# Patient Record
Sex: MALE | Race: WHITE | NOT HISPANIC OR LATINO | Employment: UNEMPLOYED | URBAN - METROPOLITAN AREA
[De-identification: names, ages, dates, MRNs, and addresses within clinical notes are randomized per-mention and may not be internally consistent; named-entity substitution may affect disease eponyms.]

---

## 2017-01-31 ENCOUNTER — HOSPITAL ENCOUNTER (EMERGENCY)
Facility: MEDICAL CENTER | Age: 32
End: 2017-02-03
Attending: EMERGENCY MEDICINE
Payer: MEDICAID

## 2017-01-31 DIAGNOSIS — F28 OTHER PSYCHOTIC DISORDER NOT DUE TO SUBSTANCE OR KNOWN PHYSIOLOGICAL CONDITION (HCC): ICD-10-CM

## 2017-01-31 LAB
AMPHET UR QL SCN: NEGATIVE
BARBITURATES UR QL SCN: NEGATIVE
BENZODIAZ UR QL SCN: POSITIVE
BZE UR QL SCN: NEGATIVE
CANNABINOIDS UR QL SCN: POSITIVE
MDMA UR QL SCN: NEGATIVE
METHADONE UR QL SCN: NEGATIVE
OPIATES UR QL SCN: NEGATIVE
OXYCODONE UR QL SCN: NEGATIVE
PCP UR QL SCN: NEGATIVE
POC BREATHALIZER: 0 PERCENT (ref 0–0.01)
PROPOXYPH UR QL SCN: NEGATIVE

## 2017-01-31 PROCEDURE — 302970 POC BREATHALIZER: Performed by: EMERGENCY MEDICINE

## 2017-01-31 PROCEDURE — 90791 PSYCH DIAGNOSTIC EVALUATION: CPT

## 2017-01-31 PROCEDURE — 80307 DRUG TEST PRSMV CHEM ANLYZR: CPT

## 2017-01-31 PROCEDURE — 99285 EMERGENCY DEPT VISIT HI MDM: CPT

## 2017-01-31 NOTE — ED NOTES
Pt continues to rest with the covers over his head, chest rise and fall visible. Sitter in place.

## 2017-01-31 NOTE — ED NOTES
"Pt got out of bed and was attempting to walk out. Pt informed that he is not able to leave at this time because of the legal hold. Pt states \"I didn't do nothing wrong to be here and two people have to legally sign me in for me to be held\". Security was called, but pt did go back to his room voluntarily. Ray with [facility] at bedside at this time.   "

## 2017-01-31 NOTE — ED NOTES
Pt sitting up on edge of bed repeatedly running his fingers through his hair and crossing his arms. Pt provided water. Asked pt if he had any additional needs, he did not respond. Did state that he would like the light on.

## 2017-01-31 NOTE — CONSULTS
Unable to assess at this moment.  Patient sound asleep, after 3 days of being awake, and was given xanax by brother.  Brother reports patient visiting and has psych history.  No charge at this time, re-counsult when awake.

## 2017-01-31 NOTE — ED NOTES
Pt ambulatory to 35, changing into hospital clothing. All medically unnecessary equipment out of room

## 2017-01-31 NOTE — ED PROVIDER NOTES
ED Provider Note    Scribed for Andrzej Maldonado M.D. by Lazaro Parikh. 1/31/2017, 11:55 AM.    Means of arrival: Walk in   History obtained from: Patient's brother  History limited by: None    CHIEF COMPLAINT  Chief Complaint   Patient presents with   • Insomnia   • Psych Eval     not currently taking meds       HPI  Mukesh Darnell is a 31 y.o. male who presents to the Emergency Department for insomnia and psych evaluation. Per patient's brother, the patient has not slept in the past 3-4 days. He says the patient is visiting him from out of town. He says he believes the patient a psychiatric disorder but he is unsure of his diagnosis, and the patient used to take medications in the past. He states he gave the patient a Xanax to help him sleep. He says the patient did mention about hurting himself. He also reports of manic behavior. He denies any methamphetamine or any other illicit drug use or alcohol use.     History obtained from patient's brother.     REVIEW OF SYSTEMS  Pertinent positives include Insomnia, manic behavior, SI. As above, all other systems reviewed and are negative.   See HPI for further details.     PAST MEDICAL HISTORY   has a past medical history of Psychiatric disorder.    SURGICAL HISTORY  patient denies any surgical history    SOCIAL HISTORY  Social History   Substance Use Topics   • Smoking status: Unknown If Ever Smoked   • Smokeless tobacco: None   • Alcohol Use: No      History   Drug Use No       FAMILY HISTORY  History reviewed. No pertinent family history.    CURRENT MEDICATIONS  No current facility-administered medications for this encounter.  No current outpatient prescriptions on file.    ALLERGIES  No Known Allergies    PHYSICAL EXAM  VITAL SIGNS: /80 mmHg  Pulse 108  Temp(Src) 37.4 °C (99.4 °F) (Temporal)  Resp 14  Wt 84.6 kg (186 lb 8.2 oz)  SpO2 95%  Vitals reviewed.  Constitutional: Will not answer questions. No apparent distress.  HENT: No signs of trauma,  Bilateral external ears normal, Nose normal.   Eyes: Pupils are equal and reactive, Conjunctiva normal, Non-icteric.   Neck: Normal range of motion, No tenderness, Supple, No stridor.   Lymphatic: No lymphadenopathy noted.   Cardiovascular: Regular rate and rhythm, no murmurs.   Thorax & Lungs: Normal breath sounds, No respiratory distress, No wheezing, No chest tenderness.   Abdomen: Bowel sounds normal, Soft, No tenderness, No peritoneal signs, No masses, No pulsatile masses.   Skin: Warm, Dry, No erythema, No rash.   Back: No bony tenderness, No CVA tenderness.   Extremities: Intact distal pulses, No edema, No tenderness, No cyanosis  Musculoskeletal: Good range of motion in all major joints. No tenderness to palpation or major deformities noted.   Neurologic: Alert , Normal motor function, Normal sensory function, No focal deficits noted.   Psychiatric: The patient will not respond question. Brother reports patient may have some suicidal ideations and manic behavior.     DIAGNOSTIC STUDIES / PROCEDURES    LABS  Labs Reviewed   POC BREATHALIZER - Normal   URINE DRUG SCREEN      Negative breathalyzer for alcohol      All labs reviewed by me.    COURSE & MEDICAL DECISION MAKING  Nursing notes, VS, PMSFHx reviewed in chart.  Differential diagnoses include but not limited to: substance abuse, alcohol intoxication, pyschosis.    11:55 AM - Patient seen and examined at bedside. Ordered for POC breathalizer, Urine drug screen to evaluate.    12:07 PM - I discussed the patient's case and the above findings with [facility] who will evaluate the patient.     1:52 PM - I spoke with [facility] who was unable to evaluate the patient due to the patient receiving Xanax from his brother. He will wait until the patient to wake up. I filled out legal hold 2000.     3:47 PM - I discussed the patient's case and the above findings with [facility]. He states patient has long pauses. He has no emotional response. He is almost  catatonic. The patient does not have insurance. We are in agreement patient will held here in the hospital and transferred for inpatient rehabilitation.      DISPOSITION:  Patient will be transferred to [facility] when accepted.       FINAL IMPRESSION  1. Other psychotic disorder not due to substance or known physiological condition          Lazaro CASAS (Scribe), am scribing for, and in the presence of, Andrzej Maldonado M.D..    Electronically signed by: Lazaro Parikh (Debraibkellie), 1/31/2017    Andrzej CASAS M.D. personally performed the services described in this documentation, as scribed by Lazaro Parikh in my presence, and it is both accurate and complete.    The note accurately reflects work and decisions made by me.  Andrzej Maldonado  1/31/2017  4:40 PM

## 2017-01-31 NOTE — ED NOTES
[facility] contacted. Pt's brother states that the pt is here visiting and he is concerned that he is having a mental break. Sates that the pt has some sort of psychiatric disorder but is unsure of his diagnosis. He does not believe that pt is under the influence of drugs or alcohol. Does report that pt has not been sleeping. States that pt had a mental break before.

## 2017-01-31 NOTE — ED NOTES
Pt resting calmly, blanket over his head. Visible chest rise and fall noted. Awaiting life skills eval.

## 2017-01-31 NOTE — ED NOTES
Chief Complaint   Patient presents with   • Insomnia   • Psych Eval     not currently taking meds       Family describes manic behavior.  Family states pt stated to them about suicidal ideation

## 2017-02-01 PROCEDURE — 700102 HCHG RX REV CODE 250 W/ 637 OVERRIDE(OP): Performed by: PSYCHIATRY & NEUROLOGY

## 2017-02-01 PROCEDURE — A9270 NON-COVERED ITEM OR SERVICE: HCPCS | Performed by: PSYCHIATRY & NEUROLOGY

## 2017-02-01 RX ORDER — CLONAZEPAM 0.5 MG/1
0.5 TABLET ORAL 2 TIMES DAILY
Status: DISCONTINUED | OUTPATIENT
Start: 2017-02-01 | End: 2017-02-04 | Stop reason: HOSPADM

## 2017-02-01 RX ORDER — LURASIDONE HYDROCHLORIDE 40 MG/1
40 TABLET, FILM COATED ORAL
Status: DISCONTINUED | OUTPATIENT
Start: 2017-02-01 | End: 2017-02-02

## 2017-02-01 RX ADMIN — LURASIDONE HYDROCHLORIDE 40 MG: 40 TABLET, FILM COATED ORAL at 18:23

## 2017-02-01 RX ADMIN — CLONAZEPAM 0.5 MG: 0.5 TABLET ORAL at 10:37

## 2017-02-01 RX ADMIN — CLONAZEPAM 0.5 MG: 0.5 TABLET ORAL at 21:55

## 2017-02-01 ASSESSMENT — PAIN SCALES - GENERAL: PAINLEVEL_OUTOF10: 0

## 2017-02-01 NOTE — DISCHARGE PLANNING
Faxed UDS, BAL and ERP note to [facility] at their request. Fax confirmation received.

## 2017-02-01 NOTE — ED NOTES
Pt has his gown on backwards and is sitting on the bed and has his penis exposed and he is touching himself and showing the female sitter. Placed a 2nd gown on pt with opening in back and advised pt that is he exposes himself again that he will be put into restraints and his behavior is not acceptable. Changed sitter to male.  Pt refuses to talk to this RN.

## 2017-02-01 NOTE — CONSULTS
"[facility]   TRIAGE ASSESSMENT    Name: Mukesh Darnell  MRN: [id number]  : 1985  Age: 31 y.o.  Date of assessment: 2017  PCP: No primary care provider on file.  Persons in attendance: Patient and brother    CHIEF COMPLAINT/PRESENTING ISSUE (as stated by patient what is problem \"nothing\", brought in by brother katiana who states patient hasn't sleep for three days, attempted to run away in snow with only underwear on and mentioned dying.  Patient has long pauses, unable to answer most questions and zero eye contact. Brother reports definate cycles of staying awake for days.  Beliefs patient has stopped all drugs including cigerates, pot and alcohol. ):   Chief Complaint   Patient presents with   • Insomnia   • Psych Eval     not currently taking meds        CURRENT LIVING SITUATION/SOCIAL SUPPORT: has been staying in [place] [facility] but came to [place] yesterday with brother due to families concerns.  Report patient has been hospitalized for mental breakdown but unaware of dx.     BEHAVIORAL HEALTH TREATMENT HISTORY  Does patient/parent report a history of prior behavioral health treatment for patient?   yes but unable to tell me where or when    SAFETY ASSESSMENT - SELF  Does patient acknowledge current or past symptoms of dangerousness to self? no  Does parent/significant other report patient has current or past symptoms of dangerousness to self? yes  Does presenting problem suggest symptoms of dangerousness to self? Yes:     Past Current    Suicidal Thoughts: [x]  []    Suicidal Plans: []  []    Suicidal Intent: []  []    Suicide Attempts: []  []    Self-Injury []  []      For any boxes checked above, provide detail: mentioined to brother today but no response but to say no during interview.    History of suicide by family member: no  History of suicide by friend/significant other: no  Recent change in frequency/specificity/intensity of suicidal thoughts or self-harm behavior? no  Current access to " firearms, medications, or other identified means of suicide/self-harm? no  If yes, willing to restrict access to means of suicide/self-harm? no  Protective factors present:  unknown    SAFETY ASSESSMENT - OTHERS  Does patient acknowledge current or past symptoms of aggressive behavior or risk to others? no  Does parent/significant other report patient has current or past symptoms of aggressive behavior or risk to others?  no  Does presenting problem suggest symptoms of dangerousness to others? No    Crisis Safety Plan completed and copy given to patient? N\A and patient unable to participate.    ABUSE/NEGLECT SCREENING  Does patient report feeling “unsafe” in his/her home, or afraid of anyone?  no  Does patient report any history of physical, sexual, or emotional abuse?  no  Does parent or significant other report any of the above? no  Is there evidence of neglect by self?  no  Is there evidence of neglect by a caregiver? no  Does the patient/parent report any history of CPS/APS/police involvement related to suspected abuse/neglect or domestic violence? no  Based on the information provided during the current assessment, is a mandated report of suspected abuse/neglect being made?  No    SUBSTANCE USE SCREENING  Has in past per brother but none recently per brother      UDS results: pending  Breathalyzer results: 0.002    Risk factors for detox (check all that apply):  No responses from pt.  [] Seizures   [] Diaphoretic (sweating)   [] Tremors   [] Hallucinations   [] Increased blood pressure   [] Decreased blood pressure   [] Other    [] Patient education on risk factors for detoxification and instructed to return to ER as needed.  MENTAL STATUS   Participation: Limited verbal participation  Grooming: Casual  Orientation: Alert and confused  Behavior: Tense  Eye contact: Indirect  Mood: Depressed and flat  Affect: Constricted  Thought process: thought blocking  Thought content: Evidence of delusion seems to be  answering voices at times  Speech: Latency of response  Perception: Evidence of auditory hallucination  Memory:  unable to assess  Insight: Poor  Judgment:  Poor  Other:    Collateral information: brother  Source:  [] Significant other present in person:   [] Significant other by telephone  [] [facility]   [] [facility] Nursing Staff  [x] [facility]  [] Other:     [] Unable to complete full assessment due to:  [] Acute intoxication  [] Patient declined to participate/engage  [x] Patient verbally unresponsive  [] Significant cognitive deficits  [x] Significant perceptual distortions or behavioral disorganization  [] Other:      CLINICAL IMPRESSIONS:  Primary:  Schizophrenic, paranoid type  Secondary:  none       IDENTIFIED NEEDS/PLAN:  [Trigger DISPOSITION list for any items marked]    [x]  Imminent safety risk - self [] Imminent safety risk - others   []  Acute substance withdrawl [x]  Psychosis/Impaired reality testing   []  Mood/anxiety []  Substance use/Addictive behavior   []  Maladaptive behaviro []  Parent/child conflict   []  Family/Couples conflict []  Biomedical   []  Housing []  Financial   []   Legal  Occupational/Educational   []  Domestic violence []  Other:     Disposition: Actively being addressed by Legal Hold    Does patient express agreement with the above plan? no    Referral appointment(s) scheduled? no    Alert team only:   I have discussed findings and recommendations with Dr. Acuña who is in agreement with these recommendations.     Referral documentation sent to the following facilities:  [facility]- no insurance.     Luis Antonio Holley R.N.  1/31/2017

## 2017-02-01 NOTE — ED PROVIDER NOTES
This is an addendum to the note on this patient.  For further details and full chart information, see the previously signed note.      1:01 PM Recheck: Patient re-evaluated at beside. Patient reports feeling better and has no complaints at this time.         IRaquel (Scribe), am scribing for, and in the presence of, Bryon Allen M.D..    Electronically signed by: Raquel Wall (Scribe), 2/1/2017    IBryon M.D. personally performed the services described in this documentation, as scribed by Raquel Wall in my presence, and it is both accurate and complete.    The note accurately reflects work and decisions made by me.  Bryon Allen  2/1/2017  1:09 PM

## 2017-02-01 NOTE — ED PROVIDER NOTES
ED PROVIDER NOTE    Scribed for Aleksandr Helms M.D. by Sailaja Jessica. 2/1/2017, 2:10 AM.    This is an addendum to the note on Mukesh Darnell. For further details and full chart entry, see the previously signed ED Provider Note written by Dr. Moore (ERP).     2:10 AM - Patient's case was transferred into my care by Dr. Palacios. See previously signed note for further details. Patient still awaiting transfer to psychiatric facility.     FINAL IMPRESSION   1. Other psychotic disorder not due to substance or known physiological condition         ISailaja (Scribe), am scribing for, and in the presence of, Aleksadnr Helms M.D..    Electronically signed by: Sailaja Jessica (Scribe), 2/1/2017    Aleksandr CASAS M.D., personally performed the services described in this documentation, as scribed by Sailaja Jessica in my presence, and it is both accurate and complete.    The note accurately reflects work and decisions made by me.  Aleksandr Helms  2/1/2017  6:48 AM

## 2017-02-01 NOTE — ED NOTES
Pt resting comfortably with blankets over his head. Visible chest rise and fall noted. Pt has drank multiple cups of water, has not eaten as of yet. Sitter remains in place.

## 2017-02-01 NOTE — ED NOTES
Spoke with patient at bedside and with brother Leo on the phone at [phone].     Brother has no knowledge of patient having any current prescription medications.  No antibiotics per brother in the past 30 days.  Brother gave patient Xanax 0.5 mg x 1 this morning to make him sleep.    Patient reports no current prescription medications or OTC products.      Charmaine Yanes, JeniseD, BCPS

## 2017-02-01 NOTE — DISCHARGE PLANNING
Medical Social Work    Referral: Legal Hold    Intervention: Legal Hold Paperwork given to SW by [facility] RN: Tiago    Legal Hold Initiated: Date: 01.31.2017 Time: 1146    Patient’s Insurance Listed on Face Sheet: None    Referrals sent to: [facility]    Plan: Patient will transfer to mental health facility once acceptance is obtained.       Awaiting ERP note and UDS. Pt's brother brought him in. Pt unable to participate in [facility] interview. Email to \[facility]\"" to screen pt (through family) for medicaid coverage.

## 2017-02-01 NOTE — PROGRESS NOTES
"Patient's home medications have been reviewed by the pharmacy team.     Past Medical History   Diagnosis Date   • Psychiatric disorder      unknown, but \"some diagnosis\" per brother       Patient's Medications    No medications on file          A:  Medications do not appear to be contributing to current complaints.  Family reports patient not taking any medications at this time.         P:    No recommendations at this time.       Niki Feliciano, PharmD, BCPS        "

## 2017-02-01 NOTE — PSYCHIATRY
"PSYCHIATRIC CONSULTATION:  Reason for consult: alf  Requesting Physician: Scooter   Psychiatric Supervising Attending: Mercedes        Chief Complaint: 32 y/o man with a history of alf presenting with 3-4 days of no sleep and bizarre behavior       HPI:  Mr. Darnell is a 32 y/o man with previous hospitalizations for alf and psychosis. He hadn't slept for 3-4 days and tried to leave the house in the snow while only in his underwear. He reports that he often can't remember what happens afterward. He reports he once woke up in care home related to this. He describes these as \"freak outs\" and states he can't sleep, feels very anxious, and hears voices that are \"very distracting\" during these times. He hasn't taken medications between episodes due to side effects.     On this admission to ED he was behaving very strangely, at first almost catatonic and refusing to eat, move, or talk, then masturbating repeatedly in front of staff. He doesn't remember this and is upset per his report that this happened. He states \"that's disgusting, I'm so sorry I did that.\"     He has been on multiple medications in the past but can't remember all of them. He states that most of them made him too drowsy to tolerate, and recognizes lithium and zyprexa as meds he's tried.     He denies si. He does report he's been depressed for some time, with anhedonia, low energy, and decreased appetite. States his family has tried to encourage him to leave the house. He thinks being depressed for some time may be what triggered this.   Review of Current Systems:  Depression +  Alf continues to be hypomanic  Anxiety +  Psychosis resolving   PTSD not evaluated  OCD  Not evaluated  Medical: denies     MSE:  Vitals:  Filed Vitals:    01/31/17 1638 01/31/17 1853 02/01/17 0425 02/01/17 0853   BP: 123/73 131/73 110/76 121/76   Pulse: 98 110 107 103   Temp: 36.3 °C (97.4 °F)  36.6 °C (97.8 °F)    TempSrc:       Resp: 15 16 16    Height:       Weight:     "   SpO2: 96%  100%        Musculoskeletal: no psychomotor agitation or retardaion   Appearance: inappropriate affect- intense eye contact and smilling strangely at times   Thought Process: mildly circumstantial at times  Though Content:denies a/vh. No overt evidence delusion   Speech: slowed   Mood: depressed  Affect: inappropriate   SI/HI:denies   Attention: fair   Memory:impaired  Orientation:oriented  Insight and Judgment:   fair  Neurological Testing: n/a    Past Psych Hx:  Previous psychiatric hospitalizations for alf and psychosis.   No outpatient provider at this time.     Family Psych Hx:  alcholism  Social Hx:  Lives with sisters. Family very involved.   Not working currently   Drugs/Alcohol/Tobacco Hx:   Marijuana  Occasional alcohol, denies heavy use   Medical Hx:         Denies     Allergies:  No Known Allergies    Medications:   No current facility-administered medications on file prior to encounter.     No current outpatient prescriptions on file prior to encounter.       Labs/ Imaging:  No orders to display     UDS + for benzos and marijuana     SI/HI Assessment Risk:    low to moderate at this time     Assessment:  Bipolar I disorder, current episode mixed        Plan:  He responded well to the xanax as an anti-manic. Also had some catatonic features so will schedule some klonopin   Adding latuda 40mg po qevening for bipolar disorder   Extending hold        Psych will follow.   Thank you for the consult.

## 2017-02-01 NOTE — ED NOTES
Pt ambulatory to bathroom, did provide urine sample. Sent to lab. Pt is currently eating a meal. Pt updated on POC. Pt remains withdrawn but more cooperative than prior.

## 2017-02-01 NOTE — ED NOTES
Pt laying on bed with blanket covering his head and body, pt was masturbating under blanket, advised pt that is it very noticeable and that he needs to stop as he can be viewed by many people in the ER.  Sitter outside room.

## 2017-02-02 ENCOUNTER — APPOINTMENT (OUTPATIENT)
Dept: RADIOLOGY | Facility: MEDICAL CENTER | Age: 32
End: 2017-02-02
Attending: EMERGENCY MEDICINE
Payer: MEDICAID

## 2017-02-02 PROCEDURE — 700111 HCHG RX REV CODE 636 W/ 250 OVERRIDE (IP): Performed by: EMERGENCY MEDICINE

## 2017-02-02 PROCEDURE — 70450 CT HEAD/BRAIN W/O DYE: CPT

## 2017-02-02 PROCEDURE — 96372 THER/PROPH/DIAG INJ SC/IM: CPT

## 2017-02-02 PROCEDURE — 700111 HCHG RX REV CODE 636 W/ 250 OVERRIDE (IP)

## 2017-02-02 PROCEDURE — A9270 NON-COVERED ITEM OR SERVICE: HCPCS | Performed by: PSYCHIATRY & NEUROLOGY

## 2017-02-02 PROCEDURE — 700102 HCHG RX REV CODE 250 W/ 637 OVERRIDE(OP): Performed by: PSYCHIATRY & NEUROLOGY

## 2017-02-02 RX ORDER — DIPHENHYDRAMINE HYDROCHLORIDE 50 MG/ML
25 INJECTION INTRAMUSCULAR; INTRAVENOUS ONCE
Status: COMPLETED | OUTPATIENT
Start: 2017-02-02 | End: 2017-02-02

## 2017-02-02 RX ORDER — RISPERIDONE 1 MG/1
1 TABLET ORAL ONCE
Status: COMPLETED | OUTPATIENT
Start: 2017-02-03 | End: 2017-02-03

## 2017-02-02 RX ORDER — LORAZEPAM 2 MG/ML
INJECTION INTRAMUSCULAR
Status: COMPLETED
Start: 2017-02-02 | End: 2017-02-02

## 2017-02-02 RX ORDER — RISPERIDONE 1 MG/1
1 TABLET ORAL 2 TIMES DAILY
Status: DISCONTINUED | OUTPATIENT
Start: 2017-02-02 | End: 2017-02-03

## 2017-02-02 RX ORDER — LORAZEPAM 2 MG/ML
2 INJECTION INTRAMUSCULAR ONCE
Status: COMPLETED | OUTPATIENT
Start: 2017-02-02 | End: 2017-02-02

## 2017-02-02 RX ORDER — HALOPERIDOL 5 MG/ML
5 INJECTION INTRAMUSCULAR ONCE
Status: COMPLETED | OUTPATIENT
Start: 2017-02-02 | End: 2017-02-02

## 2017-02-02 RX ADMIN — RISPERIDONE 1 MG: 1 TABLET, FILM COATED ORAL at 10:41

## 2017-02-02 RX ADMIN — LORAZEPAM 2 MG: 2 INJECTION INTRAMUSCULAR; INTRAVENOUS at 12:30

## 2017-02-02 RX ADMIN — DIPHENHYDRAMINE HYDROCHLORIDE 25 MG: 50 INJECTION INTRAMUSCULAR; INTRAVENOUS at 15:45

## 2017-02-02 RX ADMIN — CLONAZEPAM 0.5 MG: 0.5 TABLET ORAL at 09:10

## 2017-02-02 RX ADMIN — CLONAZEPAM 0.5 MG: 0.5 TABLET ORAL at 23:35

## 2017-02-02 RX ADMIN — HALOPERIDOL LACTATE 5 MG: 5 INJECTION, SOLUTION INTRAMUSCULAR at 15:24

## 2017-02-02 RX ADMIN — LORAZEPAM 2 MG: 2 INJECTION INTRAMUSCULAR at 12:30

## 2017-02-02 ASSESSMENT — PAIN SCALES - GENERAL: PAINLEVEL_OUTOF10: 0

## 2017-02-02 NOTE — ED NOTES
"Pt is acting a little strange. Pt jumps up and down and keep on yelling \"yahoo\". Pt stops every time he is been told to do so. RN notified. Pt is being monitored by staff.  "

## 2017-02-02 NOTE — ED NOTES
Spoke with Dr Long regarding pt's behavior, pt has gotten OOB and banging his head on the floor 3x since assuming care around 1215pm, pt assisted back to bed each time. No injury noted to pt's head. Per Dr. Long, ok to give 2mg Ativan IM or PO at this time. Will medicate pt per MAR and re-evaluate.

## 2017-02-02 NOTE — ED NOTES
Pt agreed to allow writer to place ice pack to forehead to assist with swelling and pain. Pt was sitting up in bed attempting to remove restraints, remains impulsive, will remain in restraints.

## 2017-02-02 NOTE — ED NOTES
"Pt arrived to [facility] from [facility].  Pt was reportedly standing on his bed and praying \"praise Allah\" out loud, would not follow directions, would not come off the bed.  Pt doing jumping jacks and yelling out.  Pt then got into bed and would not respond or get into a wheelchair.  Pt now resting, does not answer questions, eyes closed, only responds to painful stimuli.  Close obs initiated.  "

## 2017-02-02 NOTE — ED PROVIDER NOTES
ED PROVIDER NOTE    Scribed for Isabela Dimas M.D. by Jasper Bee. 2/2/2017, 8:39 AM.    This is an addendum to the note on Mukesh Darnell. For further details and full chart entry, see the previously signed ED Provider Note written by Dr. Chang (ERP).      8:23 AM - I discussed the patient's case with Dr. Bo (ERP) who will transfer care of the patient to me at this time.        9:20 AM - The patient was examined by me. He is resting comfortably in bed. He reports feeling well and has no complaints or requests at this time.    12:15pm.  PATIENT AGITATED, MEDICATION SCHANGED AND im aTIVAN ORDERED BY dR. Young    DISPOSITION:  Patient will be signed out to the oncoming physician pending transfer to the inpatient psychiatric facility.     FINAL IMPRESSION   1. Other psychotic disorder not due to substance or known physiological condition      IJasper (Zainab), am scribing for, and in the presence of, Isabela Dimas M.D..    Electronically signed by: Jasper Bee (Debraibe), 2/2/2017    IIsabela M.D. personally performed the services described in this documentation, as scribed by Jasper Bee in my presence, and it is both accurate and complete.    The note accurately reflects work and decisions made by me.  Isabela Dimas  2/2/2017  1:14 PM

## 2017-02-02 NOTE — ED PROVIDER NOTES
ED Provider Note Addendum    Scribed for Bhavesh Butler M.D. by Jojo Lindquist on 2/2/2017 at 3:12 PM.     This is an addendum to the note on Mukesh Darnell.  For further details and full chart information, see patient's initial note.       3:12 PM - I discussed the patient's case with Dr. Dimas ([facility]) who will transfer care of the patient to me at this time.        3:12 PM  - Patient evaluated by myself.  Patient is resting comfortably at this time with no complaints. He was informed that a CT of the head will be ordered to evaluate for damage after he was hitting his head on the floor. He understood and verbalized agreement.    3:18 PM - Ordered 5mg Haldol and 25mg Benadryl.     Radiology:  CT-HEAD W/O   Final Result      No acute intracranial abnormality is identified.      The radiologist's interpretation of all radiological studies have been reviewed by me.    Disposition:  Patient will be transferred to an appropriate psychiatric facility in stable condition for further psychiatric care and evaluation.  Patient will be placed under the care of my partner awaiting transfer.    FINAL IMPRESSION  1. 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    Electronically signed by: Jojo Lindquist (Scribe), 2/2/2017    Bhavesh CASAS M.D. personally performed the services described in this documentation, as scribed by Jojo Lindquist in my presence, and it is both accurate and complete.    The note accurately reflects work and decisions made by me.  Bhavesh Butler  2/2/2017  10:26 PM

## 2017-02-02 NOTE — DISCHARGE PLANNING
Medical Social work     SW intern received legal hold extension order from Dr. Long. Legal hold, face sheet and legal hold extension order faxed to Gissel Redmond [phone]. Received fax confirmation.     Note reviewed by: JUDITH Velasco

## 2017-02-02 NOTE — ED NOTES
Pt medicated per MAR, pt educated on medicine administration and safety, if he continues to get OOB he will have to be restrained. Pt verbalized understanding. Security aware of pt status.

## 2017-02-02 NOTE — ED NOTES
"Pt again yelling out \"help me Allah\", got out of bed and started to pray then bang his head on the floor.  ERP aware, page out for IP psych ("[employer] per her request.  Report given to Cindy ALLRED.  "

## 2017-02-02 NOTE — ED NOTES
Notified Dr Dimas of pt in restraints and minimal swelling to anterior forehead, no new orders given.

## 2017-02-02 NOTE — ED NOTES
"Pt began yelling \"help me allah, help me\" Pt squirming around in bed, restraints were adjusted for pt comfort, Dr. Butler at bedside, instructed to give 5mg haldol and 25 mg benadryl IM x 1, POC is CT.   "

## 2017-02-02 NOTE — ED NOTES
"Pt yelling out \"help me\" repeatedly, holding his head, stating the \"voices are too much\".  Comfort provided, pt reoriented.  More calm at this time.  Will continue to monitor.  "

## 2017-02-02 NOTE — ED NOTES
"Received report from LIOR Meza, assumed care, witnessed episode of pt yelling out \"help me allah\", pt was on the floor banging his head repeatedly on the floor, pt was assisted back to bed by writer and Lior Peck, attempting to reach Dr. Long, called 2x voice mail box full. Attempted inpt psych number, no answer there either, will keep trying. Pt remains in bed at this time.   "

## 2017-02-02 NOTE — PSYCHIATRY
PSYCHIATRIC FOLLOW-UP NOTE  Supervising Attending: Mercedes    ID:   32 y/o man with bipolar disorder    S:  Doing worse today, was screaming this morning in SP and was praying ritualistically on the ground. Hearing things. He feels very anxious. States he feels crazy. Discussed alternate medication options, but unfortunately he can't remember much of what he's tried before.     ROS:  Depressed  Grandiose, racing thoughts   Delusional  psychotic  All other systems reviewed and are negative.     MSE:  Vitals:  Filed Vitals:    02/01/17 2000 02/02/17 0000 02/02/17 0400 02/02/17 0920   BP: 131/78 115/62 126/75 140/86   Pulse: 95 62 68 88   Temp: 36.9 °C (98.5 °F) 36.8 °C (98.2 °F) 36.3 °C (97.3 °F) 36.8 °C (98.3 °F)   TempSrc:       Resp: 16 15 16 17   Height:       Weight:       SpO2: 94% 96% 94% 93%       Appearance: grooming poor   Behavior:+psychomotor agitation   Speech: stilted  Mood: mixed  Affect:labile  Thought Process:tangential  Thought Content +avh  Cognition: poor   Memory:poor   Attention: poor   Judgment:impaired  Insightimpaired     A:  BAD, mixed episode with psychosis    P:  D/c latuda  Starting risperdal 1mg po bid  Continue klonopin for now.

## 2017-02-02 NOTE — ED NOTES
Pt in biju watching TV calmly. Respirations are even and unlabored. NAD. CNA, ED Tech, and security in place. Rounding complete

## 2017-02-02 NOTE — ED NOTES
"Pt medicated per MAR.  Bed linens changed and pt provided with new gown, warm blankets and new disposable underwear.  Pt now tearful, stating \"I am out of control.  I'm crazy.  I'm hearing things.\"  Pt reports he does not think his medications are working.  "

## 2017-02-02 NOTE — ED NOTES
"Pt got OOB and began again banging his head against the floor, pt assisted back to bed by writer and security, pt placed in 4 pt restraints for his own safety, pt verbalized understanding and stated \"thank you\"  "

## 2017-02-03 VITALS
OXYGEN SATURATION: 98 % | TEMPERATURE: 97.6 F | SYSTOLIC BLOOD PRESSURE: 112 MMHG | HEIGHT: 68 IN | RESPIRATION RATE: 16 BRPM | BODY MASS INDEX: 28.27 KG/M2 | WEIGHT: 186.51 LBS | HEART RATE: 90 BPM | DIASTOLIC BLOOD PRESSURE: 68 MMHG

## 2017-02-03 PROCEDURE — 700102 HCHG RX REV CODE 250 W/ 637 OVERRIDE(OP): Performed by: EMERGENCY MEDICINE

## 2017-02-03 PROCEDURE — A9270 NON-COVERED ITEM OR SERVICE: HCPCS | Performed by: PSYCHIATRY & NEUROLOGY

## 2017-02-03 PROCEDURE — A9270 NON-COVERED ITEM OR SERVICE: HCPCS

## 2017-02-03 PROCEDURE — A9270 NON-COVERED ITEM OR SERVICE: HCPCS | Performed by: EMERGENCY MEDICINE

## 2017-02-03 PROCEDURE — 700102 HCHG RX REV CODE 250 W/ 637 OVERRIDE(OP): Performed by: PSYCHIATRY & NEUROLOGY

## 2017-02-03 PROCEDURE — 700102 HCHG RX REV CODE 250 W/ 637 OVERRIDE(OP)

## 2017-02-03 RX ORDER — RISPERIDONE 2 MG/1
2 TABLET ORAL EVERY EVENING
Status: DISCONTINUED | OUTPATIENT
Start: 2017-02-03 | End: 2017-02-04 | Stop reason: HOSPADM

## 2017-02-03 RX ORDER — DIPHENHYDRAMINE HCL 25 MG
25 TABLET ORAL ONCE
Status: COMPLETED | OUTPATIENT
Start: 2017-02-03 | End: 2017-02-03

## 2017-02-03 RX ORDER — RISPERIDONE 1 MG/1
1 TABLET ORAL
Status: DISCONTINUED | OUTPATIENT
Start: 2017-02-04 | End: 2017-02-04 | Stop reason: HOSPADM

## 2017-02-03 RX ORDER — HALOPERIDOL 5 MG/1
5 TABLET ORAL ONCE
Status: COMPLETED | OUTPATIENT
Start: 2017-02-03 | End: 2017-02-03

## 2017-02-03 RX ORDER — CLONAZEPAM 0.5 MG/1
TABLET ORAL
Status: COMPLETED
Start: 2017-02-03 | End: 2017-02-03

## 2017-02-03 RX ADMIN — CLONAZEPAM 0.5 MG: 0.5 TABLET ORAL at 08:30

## 2017-02-03 RX ADMIN — RISPERIDONE 1 MG: 1 TABLET, FILM COATED ORAL at 00:15

## 2017-02-03 RX ADMIN — RISPERIDONE 1 MG: 1 TABLET, FILM COATED ORAL at 09:00

## 2017-02-03 RX ADMIN — DIPHENHYDRAMINE HCL 25 MG: 25 TABLET ORAL at 08:45

## 2017-02-03 RX ADMIN — CLONAZEPAM 0.5 MG: 0.5 TABLET ORAL at 20:28

## 2017-02-03 RX ADMIN — HALOPERIDOL 5 MG: 5 TABLET ORAL at 08:45

## 2017-02-03 RX ADMIN — RISPERIDONE 2 MG: 2 TABLET ORAL at 20:28

## 2017-02-03 ASSESSMENT — PAIN SCALES - GENERAL: PAINLEVEL_OUTOF10: 0

## 2017-02-03 NOTE — ED NOTES
Risperidone dropped on the floor,wasted in the med select, requested additional dose from pharmacy.

## 2017-02-03 NOTE — ED NOTES
Patient is resting comfortably with NAD, even unlabored respirations, hospital direct observation maintained

## 2017-02-03 NOTE — ED NOTES
"Pt awake, tolerated breakfast, becoming anxious and intermittent yelling \"help me...\" unable to understand full sentence  "

## 2017-02-03 NOTE — ED NOTES
Pt was up in his room washing his hands, then proceeded to kneel on the floor, and start yelling and banging his head on the floor, writer and Dilcia Rn assisted pt to his bed safely, spoke to Dr. Butler will place pt back in restraints , for his safety, Dr. Butler will come evaluate.

## 2017-02-03 NOTE — ED NOTES
Pt escorted to CT by Ct tech, writer, and security. Pt cooperative with exam, upon arrival back to ED, pt ambulated to restroom with steady gait, due to pt's cooperation, and calmness, restraints discontinued. Educated pt on restraint release, and if pt does attempt to hurt himself again, they will be applied again. Pt verbalized understanding, security aware, sitter at bedside.

## 2017-02-03 NOTE — ED NOTES
Patient calm and cooperative, resting on gurney; no self harm discussed with pt, states will not harm self here.

## 2017-02-03 NOTE — ED NOTES
Discussion with patient about removing restraints, patient said he may continue to be impulsive and bang head against the floor.

## 2017-02-03 NOTE — ED NOTES
"Pt resting on gurney, ROM to Bilateral arms and legs, 2 pint restraints in place.   Legal hold in chart. No self harm discussed with pt, states he is hearing voice and can not control himself - wishes he had a \"padded room for safety\".     "

## 2017-02-03 NOTE — ED PROVIDER NOTES
ED Provider Note     Scribed for Santiago Alvarez M.D. by Talya Menendez. 2/3/2017  8:25 AM    8:25 AM At this time nursing staff noted the patient to be agitated. Ordered 5 mg Haldol and 25 mg of benadryl.     12:52 PM . Patient much more comfortable, awaiting transfer to appropriate psychiatric facility.      The note accurately reflects work and decisions made by me.  Santiago Alvarez  2/3/2017  12:52 PM

## 2017-02-03 NOTE — ED NOTES
Spoke with Dr. Bo go down to 2 restraints order placed, security informed, report to CHALINO Rivers

## 2017-02-04 NOTE — DISCHARGE PLANNING
Pt was accepted to [facility] by Dr. Tsai. [facility] requested a transport time of 2330. SW called [facility] and set up transport for 2330 through [facility]. SW updated bedside RN and completed transfer packet.

## 2017-02-12 ENCOUNTER — HOSPITAL ENCOUNTER (EMERGENCY)
Facility: MEDICAL CENTER | Age: 32
End: 2017-02-12
Attending: EMERGENCY MEDICINE
Payer: MEDICAID

## 2017-02-12 VITALS
DIASTOLIC BLOOD PRESSURE: 86 MMHG | SYSTOLIC BLOOD PRESSURE: 128 MMHG | WEIGHT: 182.76 LBS | RESPIRATION RATE: 18 BRPM | HEART RATE: 89 BPM | BODY MASS INDEX: 27.8 KG/M2

## 2017-02-12 DIAGNOSIS — F25.9 SCHIZOAFFECTIVE DISORDER, UNSPECIFIED TYPE (HCC): ICD-10-CM

## 2017-02-12 PROCEDURE — 99284 EMERGENCY DEPT VISIT MOD MDM: CPT

## 2017-02-12 PROCEDURE — 700102 HCHG RX REV CODE 250 W/ 637 OVERRIDE(OP): Performed by: EMERGENCY MEDICINE

## 2017-02-12 PROCEDURE — A9270 NON-COVERED ITEM OR SERVICE: HCPCS | Performed by: EMERGENCY MEDICINE

## 2017-02-12 PROCEDURE — 90791 PSYCH DIAGNOSTIC EVALUATION: CPT

## 2017-02-12 RX ORDER — DIAZEPAM 5 MG/1
5 TABLET ORAL ONCE
Status: COMPLETED | OUTPATIENT
Start: 2017-02-12 | End: 2017-02-12

## 2017-02-12 RX ORDER — LORAZEPAM 1 MG/1
1 TABLET ORAL EVERY 6 HOURS PRN
Qty: 10 TAB | Refills: 0 | Status: SHIPPED | OUTPATIENT
Start: 2017-02-12

## 2017-02-12 RX ORDER — DIAZEPAM 5 MG/1
5 TABLET ORAL ONCE
Status: DISCONTINUED | OUTPATIENT
Start: 2017-02-12 | End: 2017-02-12

## 2017-02-12 RX ORDER — LORAZEPAM 1 MG/1
2 TABLET ORAL ONCE
Status: COMPLETED | OUTPATIENT
Start: 2017-02-12 | End: 2017-02-12

## 2017-02-12 RX ADMIN — LORAZEPAM 2 MG: 1 TABLET ORAL at 21:30

## 2017-02-12 RX ADMIN — DIAZEPAM 5 MG: 5 TABLET ORAL at 20:05

## 2017-02-12 ASSESSMENT — PAIN SCALES - GENERAL: PAINLEVEL_OUTOF10: 0

## 2017-02-12 NOTE — ED AVS SNAPSHOT
2/12/2017          Mukesh Darnell  [address] [address]    Dear Mukesh:    [facility] wants to ensure your discharge home is safe and you or your loved ones have had all your questions answered regarding your care after you leave the hospital.    You may receive a telephone call within two days of your discharge.  This call is to make certain you understand your discharge instructions as well as ensure we provided you with the best care possible during your stay with us.     The call will only last approximately 3-5 minutes and will be done by a nurse.    Once again, we want to ensure your discharge home is safe and that you have a clear understanding of any next steps in your care.  If you have any questions or concerns, please do not hesitate to contact us, we are here for you.  Thank you for choosing [facility] for your healthcare needs.    Sincerely,    Juanjo Cortez    [facility]

## 2017-02-12 NOTE — ED AVS SNAPSHOT
Home Care Instructions                                                                                                                Mukesh Darnell   MRN: [id number]    Department:  [facility], Emergency Dept   Date of Visit:  2/12/2017            [facility], Emergency Dept    [address]    [place] [address]    Phone:  [phone]      You were seen by     Tashi Amin M.D.      Your Diagnosis Was     Schizoaffective disorder, unspecified type (CMS-HCC)     F25.9       These are the medications you received during your hospitalization from 02/12/2017 1922 to 02/12/2017 2224     Date/Time Order Dose Route Action    02/12/2017 2005 diazepam (VALIUM) tablet 5 mg 5 mg Oral Given    02/12/2017 2130 lorazepam (ATIVAN) tablet 2 mg 2 mg Oral Given      Follow-up Information     1. Please follow up.    Why:  at your mental health appointment      Medication Information     Review all of your home medications and newly ordered medications with your primary doctor and/or pharmacist as soon as possible. Follow medication instructions as directed by your doctor and/or pharmacist.     Please keep your complete medication list with you and share with your physician. Update the information when medications are discontinued, doses are changed, or new medications (including over-the-counter products) are added; and carry medication information at all times in the event of emergency situations.               Medication List      START taking these medications        Instructions    lorazepam 1 MG Tabs   Commonly known as:  ATIVAN    Take 1 Tab by mouth every 6 hours as needed for Anxiety.   Dose:  1 mg               Procedures and tests performed during your visit     CLOSE OBSERVATION    Life-Skills consult    Remove patient belongings        Discharge Instructions       Schizoaffective Disorder  Schizoaffective disorder (ScAD) is a mental illness. It causes symptoms that are a mixture  of schizophrenia (a psychotic disorder) and an affective (mood) disorder. The schizophrenic symptoms may include delusions, hallucinations, or odd behavior. The mood symptoms may be similar to major depression or bipolar disorder. ScAD may interfere with personal relationships or normal daily activities. People with ScAD are at increased risk for job loss, social isolation, physical health problems, anxiety and substance use disorders, and suicide.  ScAD usually occurs in cycles. Periods of severe symptoms are followed by periods of  less severe symptoms or improvement. The illness affects men and women equally but usually appears at an earlier age (teenage or early adult years) in men. People who have family members with schizophrenia, bipolar disorder, or ScAD are at higher risk of developing ScAD.  SYMPTOMS   At any one time, people with ScAD may have psychotic symptoms only or both psychotic and mood symptoms. The psychotic symptoms include one or more of the following:  · Hearing, seeing, or feeling things that are not there (hallucinations).    · Having fixed, false beliefs (delusions). The delusions usually are of being attacked, harassed, cheated, persecuted, or conspired against (paranoid delusions).  · Speaking in a way that makes no sense to others (disorganized speech).  The psychotic symptoms of ScAD may also include confusing or odd behavior or any of the negative symptoms of schizophrenia. These include loss of motivation for normal daily activities, such as bathing or grooming, withdrawal from other people, and lack of emotions.     The mood symptoms of ScAD occur more often than not. They resemble major depressive disorder or bipolar alf. Symptoms of major depression include depressed mood and four or more of the following:  · Loss of interest in usually pleasurable activities (anhedonia).  · Sleeping more or less than normal.  · Feeling worthless or excessively guilty.  · Lack of energy or  motivation.  · Trouble concentrating.  · Eating more or less than usual.  · Thinking a lot about death or suicide.  Symptoms of bipolar alf include abnormally elevated or irritable mood and increased energy or activity, plus three or more of the following:    · More confidence than normal or feeling that you are able to do anything (grandiosity).  · Feeling rested with less sleep than normal.    · Being easily distracted.    · Talking more than usual or feeling pressured to keep talking.    · Feeling that your thoughts are racing.  · Engaging in high-risk activities such as buying sprees or foolish business decisions.  DIAGNOSIS   ScAD is diagnosed through an assessment by your health care provider. Your health care provider will observe and ask questions about your thoughts, behavior, mood, and ability to function in daily life. Your health care provider may also ask questions about your medical history and use of drugs, including prescription medicines. Your health care provider may also order blood tests and imaging exams. Certain medical conditions and substances can cause symptoms that resemble ScAD. Your health care provider may refer you to a mental health specialist for evaluation.   ScAD is divided into two types. The depressive type is diagnosed if your mood symptoms are limited to major depression. The bipolar type is diagnosed if your mood symptoms are manic or a mixture of manic and depressive symptoms  TREATMENT   ScAD is usually a lifelong illness. Long-term treatment is necessary. The following treatments are available:  · Medicine. Different types of medicine are used to treat ScAD. The exact combination depends on the type and severity of your symptoms. Antipsychotic medicine is used to control psychotic symptoms such as delusions, paranoia, and hallucinations. Mood stabilizers can even the highs and lows of bipolar manic mood swings. Antidepressant medicines are used to treat major depressive  symptoms.  · Counseling or talk therapy. Individual, group, or family counseling may be helpful in providing education, support, and guidance. Many people with ScAD also benefit from social skills and job skills (vocational) training.  A combination of medicine and counseling is usually best for managing the disorder over time. A procedure in which electricity is applied to the brain through the scalp (electroconvulsive therapy) may be used to treat people with severe manic symptoms that do not respond to medicine and counseling.  HOME CARE INSTRUCTIONS   · Take all your medicine as prescribed.  · Check with your health care provider before starting new prescription or over-the-counter medicines.  · Keep all follow up appointments with your health care provider.  SEEK MEDICAL CARE IF:   · If you are not able to take your medicines as prescribed.  · If your symptoms get worse.  SEEK IMMEDIATE MEDICAL CARE IF:   · You have serious thoughts about hurting yourself or others.     This information is not intended to replace advice given to you by your health care provider. Make sure you discuss any questions you have with your health care provider.     Document Released: 04/29/2008 Document Revised: 01/08/2016 Document Reviewed: 08/01/2014  Elsevier Interactive Patient Education ©2016 [employer]            Patient Information     Patient Information    Following emergency treatment: all patient requiring follow-up care must return either to a private physician or a clinic if your condition worsens before you are able to obtain further medical attention, please return to the emergency room.     Billing Information    At [facility], we work to make the billing process streamlined for our patients.  Our Representatives are here to answer any questions you may have regarding your hospital bill.  If you have insurance coverage and have supplied your insurance information to us, we will submit a claim to your insurer on  your behalf.  Should you have any questions regarding your bill, we can be reached online or by phone as follows:  Online: You are able pay your bills online or live chat with our representatives about any billing questions you may have. We are here to help Monday - Friday from 8:00am to 7:30pm and 9:00am - 12:00pm on Saturdays.  Please visit [email or web address]  for more information.   Phone:  [phone] or [phone]    Please note that your emergency physician, surgeon, pathologist, radiologist, anesthesiologist, and other specialists are not employed by [facility] and will therefore bill separately for their services.  Please contact them directly for any questions concerning their bills at the numbers below:     Emergency Physician Services:  [phone]  [facility]:  [phone]  [facility]:  [phone]  [facility]:  [phone]    1. Your final bill may vary from the amount quoted upon discharge if all procedures are not complete at that time, or if your doctor has additional procedures of which we are not aware. You will receive an additional bill if you return to the Emergency Department at [facility] for suture removal regardless of the facility of which the sutures were placed.     2. Please arrange for settlement of this account at the emergency registration.    3. All self-pay accounts are due in full at the time of treatment.  If you are unable to meet this obligation then payment is expected within 4-5 days.     4. If you have had radiology studies (CT, X-ray, Ultrasound, MRI), you have received a preliminary result during your emergency department visit. Please contact the radiology department [phone] to receive a copy of your final result. Please discuss the Final result with your primary physician or with the follow up physician provided.     Crisis Hotline:  National Crisis Hotline:   5-983-WYHQVJC or 1-514.532.6522  [place] Crisis Hotline:    1-411.775.9312 or 289-218-4190         ED Discharge Follow Up Questions    1. In order to provide you with very good care, we would like to follow up with a phone call in the next few days.  May we have your permission to contact you?     YES /  NO    2. What is the best phone number to call you? (       )_____-__________    3. What is the best time to call you?      Morning  /  Afternoon  /  Evening                   Patient Signature:  ____________________________________________________________    Date:  ____________________________________________________________

## 2017-02-12 NOTE — ED AVS SNAPSHOT
'Rock' Your Paper Access Code: [email or web address]  Expires: 3/10/2017 11:48 AM    Your email address is not on file at [employer].  Email Addresses are required for you to sign up for 'Rock' Your Paper, please contact [phone] to verify your personal information and to provide your email address prior to attempting to register for 'Rock' Your Paper.    Mukesh Darnell  [address] [place] [address] [place] [address]    'Rock' Your Paper  A secure, online tool to manage your health information     [employer]’s 'Rock' Your Paper® is a secure, online tool that connects you to your personalized health information from the privacy of your home -- day or night - making it very easy for you to manage your healthcare. Once the activation process is completed, you can even access your medical information using the 'Rock' Your Paper jonh, which is available for free in the Apple Jonh store or Google Play store.     To learn more about 'Rock' Your Paper, visit [email or web address]/'Rock' Your Paper    There are two levels of access available (as shown below):   My Chart Features  [facility] Primary Care Doctor [facility]  Specialists [facility]  Urgent  Care Non-[facility] Primary Care Doctor   Email your healthcare team securely and privately 24/7 X X X    Manage appointments: schedule your next appointment; view details of past/upcoming appointments X      Request prescription refills. X      View recent personal medical records, including lab and immunizations X X X X   View health record, including health history, allergies, medications X X X X   Read reports about your outpatient visits, procedures, consult and ER notes X X X X   See your discharge summary, which is a recap of your hospital and/or ER visit that includes your diagnosis, lab results, and care plan X X  X     How to register for [employer]:  Once your e-mail address has been verified, follow the following steps to sign up for 'Rock' Your Paper.     1. Go to  [email or web address]  2. Click on the Sign Up Now box, which takes you to the New Member Sign Up page. You will  need to provide the following information:  a. Enter your [facility] Access Code exactly as it appears at the top of this page. (You will not need to use this code after you’ve completed the sign-up process. If you do not sign up before the expiration date, you must request a new code.)   b. Enter your date of birth.   c. Enter your home email address.   d. Click Submit, and follow the next screen’s instructions.  3. Create a [employer] ID. This will be your [facility] login ID and cannot be changed, so think of one that is secure and easy to remember.  4. Create a [facility] password. You can change your password at any time.  5. Enter your Password Reset Question and Answer. This can be used at a later time if you forget your password.   6. Enter your e-mail address. This allows you to receive e-mail notifications when new information is available in [facility].  7. Click Sign Up. You can now view your health information.    For assistance activating your [facility] account, call [phone]

## 2017-02-13 NOTE — ED NOTES
"Chief Complaint   Patient presents with   • Psych Eval     pt rocking back in chair not talking. sister says here was here 1 week ago for same. 1 year ago had a \"metal beakdown\" and is having another. seen at [facility] - and d/c on wednesday. last night pt couldnt sleep and manic. denies SI/HI     Denies SI/HI per sister. However pt would not answer my question on si/hi   "

## 2017-02-13 NOTE — ED NOTES
Discharge instructions provided with Rx x 1. Verbalized understanding of follow-up care, medication management & reasons to return to ED.  Pt to follow-up at his appt with KRISHNA Tuesday.  Released in stable condition with family members.

## 2017-02-13 NOTE — CONSULTS
"[facility]   TRIAGE ASSESSMENT    Name: Mukesh Darnell  MRN: [id number]  : 1985  Age: 31 y.o.  Date of assessment: 2017  PCP: Pcp Pt States None  Persons in attendance: Patient and Siblings, brother and sister    CHIEF COMPLAINT/PRESENTING ISSUE  as stated by his sister, patient was released from [facility] on Wednesday.  He was talking and engaged.  He did not sleep last night.  In the ER, he punched himself, plugged his ears with his finger and made a loud irritated noise.  He received valium 5 mg and then Ativan 2 mg and he stopped this behavior. He is scheduled for another injection of Invega Susteena on Tuesday.  He had one on the 7th also.  No other medication was provided with his discharge.  Per family report patient has an extensive prescription opiate addiction history in addition to alcohol and THC.  He tried to stop twice but relapsed.  His family also reports he is not smoking THC, cigarettes or drinking ETOH so he became agitated after being home from Wednesday to .  His sister also reports they have another brother who also has similar mental health issues.    Chief Complaint   Patient presents with   • Psych Eval     pt rocking back in chair not talking. sister says here was here 1 week ago for same. 1 year ago had a \"metal beakdown\" and is having another. seen at [facility] - and d/c on wednesday. last night pt couldnt sleep and manic. denies SI/HI        CURRENT LIVING SITUATION/SOCIAL SUPPORT: Living with his two sister and their children.  He was working as a  until a couple of months ago in [place] before that in [place].  He has been in [employer] for 2 months.    BEHAVIORAL HEALTH TREATMENT HISTORY  Does patient/parent report a history of prior behavioral health treatment for patient?   Yes:    Dates Level of Care Facilty/Provider Diagnosis/Problem Medications   [facility]                                                                  " "        SAFETY ASSESSMENT - SELF  Does patient acknowledge current or past symptoms of dangerousness to self? no  Does parent/significant other report patient has current or past symptoms of dangerousness to self? N\A  Does presenting problem suggest symptoms of dangerousness to self? No    SAFETY ASSESSMENT - OTHERS  Does patient acknowledge current or past symptoms of aggressive behavior or risk to others? no  Does parent/significant other report patient has current or past symptoms of aggressive behavior or risk to others?  N\A  Does presenting problem suggest symptoms of dangerousness to others? No    Crisis Safety Plan completed and copy given to patient? N\A    ABUSE/NEGLECT SCREENING  Does patient report feeling “unsafe” in his/her home, or afraid of anyone?  no  Does patient report any history of physical, sexual, or emotional abuse?  no  Does parent or significant other report any of the above? N\A  Is there evidence of neglect by self?  no  Is there evidence of neglect by a caregiver? no  Does the patient/parent report any history of CPS/APS/police involvement related to suspected abuse/neglect or domestic violence? no  Based on the information provided during the current assessment, is a mandated report of suspected abuse/neglect being made?  No    SUBSTANCE USE SCREENING  Yes:  Oc all substances used in the past 30 days: History of opiates, THC, ETOH and possibly other drugs.  Patient refused to answer.     Last Use Amount   []   Alcohol     [x]   Marijuana +UDS 1/31/17    []   Heroin     []   Prescription Opioids  (used without prescription, for    recreation, or in excess of prescribed amount)     []   Other Prescription  (used without prescription, for    recreation, or in excess of prescribed amount)     []   Cocaine      []   Methamphetamine     []   \"\" drugs (ectasy, MDMA)     []   Other substances        UDS results: pending  Breathalyzer results: 0.0    What consequences does the " patient associate with any of the above substance use and or addictive behaviors? None    Risk factors for detox (check all that apply):Denies   []  Seizures   []  Diaphoretic (sweating)   []  Tremors   []  Hallucinations   []  Increased blood pressure   []  Decreased blood pressure   []  Other   []  None      [] Patient education on risk factors for detoxification and instructed to return to ER as needed.      MENTAL STATUS   Participation: refused to answer questions.  Grooming: Casual  Orientation: Alert  Behavior: Agitated  Eye contact: Limited  Mood: Irritable  Until he received benzos  Affect: Blunted  Thought process: nothing specific, he would not talk until the benzos.  Thought content: uncooperative with assessment.  Speech: did not talk to this writer. Appropriate with nice young nurse.  Perception: would not answer  Memory:  No gross evidence of memory deficits  Insight: Adequate  Judgment:  Adequate  Other:    Collateral information:   Source:  [x] Significant other present in person: sister  [x] Significant other by telephone another sister   [facility]   [] [facility] Staff  [x] [facility] Record, and life skills assessment from 1/31/2017  [] Other:     [] Unable to complete full assessment due to:  [] Acute intoxication  [] Patient declined to participate/engage  [] Patient verbally unresponsive  [] Significant cognitive deficits  [] Significant perceptual distortions or behavioral disorganization  [] Other:      CLINICAL IMPRESSIONS:  Primary:  Schizoaffective disorder, bipolar type  Secondary:  Polysubstance use disorder, partial remission    IDENTIFIED NEEDS/PLAN:  [Trigger DISPOSITION list for any items marked]    []  Imminent safety risk - self [] Imminent safety risk - others   []  Acute substance withdrawl [x]  Psychosis/Impaired reality testing   [x]  Mood/anxiety [x]  Substance use/Addictive behavior   []  Maladaptive behaviro []  Parent/child conflict   []  Family/Couples  conflict []  Biomedical   []  Housing []  Financial   []   Legal  Occupational/Educational   []  Domestic violence []  Other:     Disposition: Refer to [facility] outpatient on Tuesday    Does patient express agreement with the above plan? yes    Referral appointment(s) scheduled? N\A    Alert team only:   I have discussed findings and recommendations with Dr. Amin who is in agreement with these recommendations.   No legal hold needed at this time.    Referral documentation sent to the following facilities:  Resource list provided for support, counseling and sobriety services.        Jinny Cruz R.N.  2/12/2017

## 2017-02-13 NOTE — ED PROVIDER NOTES
"ER PROVIDER NOTE    Scribed for Tashi Amin M.D.  by Irma Fernandez. 2/12/2017 at 7:46 PM.    Primary Care Provider: Pcp Pt States None  Means of Arrival: Walk-in    History obtained from: Sister, brother   History limited by: Psychiatric condition     CHIEF COMPLAINT  Chief Complaint   Patient presents with   • Psych Eval     pt rocking back in chair not talking. sister says here was here 1 week ago for same. 1 year ago had a \"metal beakdown\" and is having another. seen at [facility] - and d/c on wednesday. last night pt couldnt sleep and manic. denies SI/HI       HPI  Mukesh Darnell is a 31 y.o. male who presents to the emergency department for a psychiatric evaluation. The patient was seen in the ED on 1/31/17 for similar complaints and was sent to [facility] at that time. He was discharged 4 days ago. Per sister, the patient was unable to sleep last night or today, even after taking Benadryl. She states that the patient has not eaten or drank since this morning. He has been agitated and was punching himself. Family states that the patient is having a \"break\" and his current symptoms are similar to when he was seen here a few weeks ago. Sister states that the patient denies suicidal or homicidal ideations to her. He has a history of schizoaffective disorder and manic affective disorder. Per sister, he is taking something that is in a shot form, administered weekly, but she is unsure what this medication is. Family does not know the patient's full psychiatric history as he recently moved here from [place] where he was living with other family members. Upon evaluation, the patient is standing by the bed and does not answer questions. He requests something to help him sleep.     Further details of the HPI are limited secondary to the psychiatric condition of the patient. Patient is minimally interactive and history is obtained from family.     REVIEW OF SYSTEMS  Pertinent positives include psychiatric evaluation. " Pertinent negatives include no suicidal ideations, homicidal ideations. See HPI for details.     Further details of the ROS are limited secondary to the psychiatric condition of the patient.    PAST MEDICAL HISTORY   has a past medical history of Psychiatric disorder; Schizoaffective disorder, bipolar type (CMS-HCC); and Manic affective disorder with recurrent episode (CMS-HCC).    SURGICAL HISTORY  patient denies any surgical history    FAMILY HISTORY  Noncontributory.      SOCIAL HISTORY  Social History     Social History   • Marital Status: Single     Spouse Name: N/A   • Number of Children: N/A   • Years of Education: N/A     Social History Main Topics   • Smoking status: Unknown If Ever Smoked   • Alcohol Use: No   • Drug Use: No      History   Drug Use No       CURRENT MEDICATIONS  Home Medications    Medications have been reviewed. See nurses notes for further details.         ALLERGIES  No Known Allergies    PHYSICAL EXAM  VITAL SIGNS: /86 mmHg  Pulse 89  Resp 18  Wt 82.9 kg (182 lb 12.2 oz)    Constitutional: Alert. Withdrawn, makes no eye contact. Appears restless.   HENT: No signs of trauma, Bilateral external ears normal, Nose normal.   Eyes: Pupils are equal and reactive, Conjunctiva normal, Non-icteric.   Neck: Normal range of motion, No tenderness, Supple, No stridor.   Lymphatic: No lymphadenopathy noted.   Cardiovascular: Regular rate and rhythm, no murmurs.   Thorax & Lungs: Normal breath sounds, No respiratory distress, No wheezing, No chest tenderness.   Abdomen: Bowel sounds normal, Soft, No tenderness, No masses, No pulsatile masses. No peritoneal signs.  Skin: Warm, Dry, No erythema, No rash.   Back: No bony tenderness, No CVA tenderness.   Extremities: Intact distal pulses, No edema, No tenderness, No cyanosis.   Musculoskeletal: Good range of motion in all major joints. No tenderness to palpation or major deformities noted.   Neurologic: Alert , Normal motor function, Normal sensory  function, No focal deficits noted.   Psychiatric: Withdrawn, minimal interaction.     DIAGNOSTIC STUDIES / PROCEDURES    LABS  Labs Reviewed   URINE DRUG SCREEN   POC BREATHALIZER   All labs reviewed by me.    COURSE & MEDICAL DECISION MAKING  Nursing notes, VS, PMSFHx reviewed in chart.    Review of past medical records shows the patient was seen in the ED last week for similar complaints. He was transferred to [facility] at that time.     7:46 PM Patient seen and examined at bedside. Patient will be treated with 5 mg Valium PO. Ordered for a urine drug screen and POC breathalizer to evaluate his symptoms. Life skills will see the patient.     8:40 PM Spoke with Jinny peterson, who has evaluated the patient. The patient has stopped smoking marijuana and life skills believes he may be having psychological withdrawal from this. She recommends sending the patient home with Ativan. He will be seen again at [facility] in 2 days.     9:23 PM mireille Stearns, reports that the patient has become more agitated, hitting himself. Family is requesting more medications. The patient will be treated with 2 mg Ativan PO.     On reevaluation, the patient is comfortable, conversant, denies any suicidal thoughts or hallucinations.    Decision Making:  This is a 31 y.o. male presenting with some agitation as well as being withdrawn. He does not appear to be in any acute danger to himself or to others at this time, and he actually has a follow-up appointment scheduled with [facility] in 2 days. He has been seen and cleared by life skills He does seem to be responding well to benzodiazepines so I'll prescribe him a short course of this until his follow-up    I reviewed prescription monitoring program for patient's narcotic use before prescribing a scheduled drug.The patient will not drink alcohol nor drive with prescribed medications. The patient will return for new or worsening symptoms and is stable at the time of discharge.    The patient  is referred to a primary physician for blood pressure management, diabetic screening, and for all other preventative health concerns.    DISPOSITION:  Patient will be discharged home in stable condition.    FOLLOW UP:        at your mental health appointment      OUTPATIENT MEDICATIONS:  Discharge Medication List as of 2/12/2017 10:24 PM      START taking these medications    Details   lorazepam (ATIVAN) 1 MG Tab Take 1 Tab by mouth every 6 hours as needed for Anxiety., Disp-10 Tab, R-0, Print Rx Paper           FINAL IMPRESSION  1. Schizoaffective disorder, unspecified type (CMS-HCC)      Irma CASAS (Scribe), am scribing for, and in the presence of, Tashi Amin M.D..    Electronically signed by: Irma Fernandez (Zainab), 2/12/2017    ITashi M.D. personally performed the services described in this documentation, as scribed by Irma Fernandez in my presence, and it is both accurate and complete.     The note accurately reflects work and decisions made by me.  Tashi Amin  2/12/2017  11:26 PM

## 2017-02-13 NOTE — ED NOTES
Pt acting out, screaming in room.  Ativan ordered & will continue to monitor.  Calm & cooperative when Ativan administered. Family remains at bedside.

## 2017-02-13 NOTE — DISCHARGE INSTRUCTIONS
Schizoaffective Disorder  Schizoaffective disorder (ScAD) is a mental illness. It causes symptoms that are a mixture of schizophrenia (a psychotic disorder) and an affective (mood) disorder. The schizophrenic symptoms may include delusions, hallucinations, or odd behavior. The mood symptoms may be similar to major depression or bipolar disorder. ScAD may interfere with personal relationships or normal daily activities. People with ScAD are at increased risk for job loss, social isolation, physical health problems, anxiety and substance use disorders, and suicide.  ScAD usually occurs in cycles. Periods of severe symptoms are followed by periods of  less severe symptoms or improvement. The illness affects men and women equally but usually appears at an earlier age (teenage or early adult years) in men. People who have family members with schizophrenia, bipolar disorder, or ScAD are at higher risk of developing ScAD.  SYMPTOMS   At any one time, people with ScAD may have psychotic symptoms only or both psychotic and mood symptoms. The psychotic symptoms include one or more of the following:  · Hearing, seeing, or feeling things that are not there (hallucinations).    · Having fixed, false beliefs (delusions). The delusions usually are of being attacked, harassed, cheated, persecuted, or conspired against (paranoid delusions).  · Speaking in a way that makes no sense to others (disorganized speech).  The psychotic symptoms of ScAD may also include confusing or odd behavior or any of the negative symptoms of schizophrenia. These include loss of motivation for normal daily activities, such as bathing or grooming, withdrawal from other people, and lack of emotions.     The mood symptoms of ScAD occur more often than not. They resemble major depressive disorder or bipolar alf. Symptoms of major depression include depressed mood and four or more of the following:  · Loss of interest in usually pleasurable activities  (anhedonia).  · Sleeping more or less than normal.  · Feeling worthless or excessively guilty.  · Lack of energy or motivation.  · Trouble concentrating.  · Eating more or less than usual.  · Thinking a lot about death or suicide.  Symptoms of bipolar alf include abnormally elevated or irritable mood and increased energy or activity, plus three or more of the following:    · More confidence than normal or feeling that you are able to do anything (grandiosity).  · Feeling rested with less sleep than normal.    · Being easily distracted.    · Talking more than usual or feeling pressured to keep talking.    · Feeling that your thoughts are racing.  · Engaging in high-risk activities such as buying sprees or foolish business decisions.  DIAGNOSIS   ScAD is diagnosed through an assessment by your health care provider. Your health care provider will observe and ask questions about your thoughts, behavior, mood, and ability to function in daily life. Your health care provider may also ask questions about your medical history and use of drugs, including prescription medicines. Your health care provider may also order blood tests and imaging exams. Certain medical conditions and substances can cause symptoms that resemble ScAD. Your health care provider may refer you to a mental health specialist for evaluation.   ScAD is divided into two types. The depressive type is diagnosed if your mood symptoms are limited to major depression. The bipolar type is diagnosed if your mood symptoms are manic or a mixture of manic and depressive symptoms  TREATMENT   ScAD is usually a lifelong illness. Long-term treatment is necessary. The following treatments are available:  · Medicine. Different types of medicine are used to treat ScAD. The exact combination depends on the type and severity of your symptoms. Antipsychotic medicine is used to control psychotic symptoms such as delusions, paranoia, and hallucinations. Mood stabilizers can  even the highs and lows of bipolar manic mood swings. Antidepressant medicines are used to treat major depressive symptoms.  · Counseling or talk therapy. Individual, group, or family counseling may be helpful in providing education, support, and guidance. Many people with ScAD also benefit from social skills and job skills (vocational) training.  A combination of medicine and counseling is usually best for managing the disorder over time. A procedure in which electricity is applied to the brain through the scalp (electroconvulsive therapy) may be used to treat people with severe manic symptoms that do not respond to medicine and counseling.  HOME CARE INSTRUCTIONS   · Take all your medicine as prescribed.  · Check with your health care provider before starting new prescription or over-the-counter medicines.  · Keep all follow up appointments with your health care provider.  SEEK MEDICAL CARE IF:   · If you are not able to take your medicines as prescribed.  · If your symptoms get worse.  SEEK IMMEDIATE MEDICAL CARE IF:   · You have serious thoughts about hurting yourself or others.     This information is not intended to replace advice given to you by your health care provider. Make sure you discuss any questions you have with your health care provider.     Document Released: 04/29/2008 Document Revised: 01/08/2016 Document Reviewed: 08/01/2014  [employer] Interactive Patient Education ©2016 [employer]

## 2017-02-28 NOTE — PSYCHIATRY
PSYCHIATRIC FOLLOW-UP NOTE  Supervising Attending: Mercedes    ID:   32 y/o man with bipolar disorder   S:  Thinks he may feel slightly better on the risperdal. Still getting agitated on and off with depression and clear moments in between. States he doesn't remember everything that happens when he is agitated. He is still somewhat pressured and is still psychotic.     ROS:  Depressed  Grandiose, racing thoughts    Delusional  psychotic  All other systems reviewed and are negative.        MSE:  Vitals:  Filed Vitals:    02/03/17 0516 02/03/17 0848 02/03/17 1846 02/03/17 2328   BP: 108/78 117/80 111/71 112/68   Pulse: 92 103 99 90   Temp: 36.4 °C (97.6 °F)      TempSrc: Temporal      Resp: 16 16 16 16   Height:       Weight:       SpO2: 97% 96% 96% 98%         Appearance: grooming poor    Behavior:+psychomotor agitation    Speech: stilted  Mood: mixed  Affect:labile  Thought Process:tangential  Thought Content +avh  Cognition: poor    Memory:poor    Attention: poor    Judgment:impaired  Insightimpaired     A:  BAD, mixed episode with psychosis    P:  Increase risperdal  To 2mg po bid  Continue klonopin for now.
